# Patient Record
Sex: MALE | Race: OTHER | HISPANIC OR LATINO | URBAN - METROPOLITAN AREA
[De-identification: names, ages, dates, MRNs, and addresses within clinical notes are randomized per-mention and may not be internally consistent; named-entity substitution may affect disease eponyms.]

---

## 2021-06-27 ENCOUNTER — IMMUNIZATION (OUTPATIENT)
Dept: PRIMARY CARE CLINIC | Facility: CLINIC | Age: 21
End: 2021-06-27

## 2021-06-27 DIAGNOSIS — Z23 NEED FOR VACCINATION: Primary | ICD-10-CM

## 2021-06-27 PROCEDURE — 91300 COVID-19, MRNA, LNP-S, PF, 30 MCG/0.3 ML DOSE VACCINE: CPT | Mod: S$GLB,,, | Performed by: INTERNAL MEDICINE

## 2021-06-27 PROCEDURE — 0001A COVID-19, MRNA, LNP-S, PF, 30 MCG/0.3 ML DOSE VACCINE: CPT | Mod: CV19,S$GLB,, | Performed by: INTERNAL MEDICINE

## 2021-06-27 PROCEDURE — 91300 COVID-19, MRNA, LNP-S, PF, 30 MCG/0.3 ML DOSE VACCINE: ICD-10-PCS | Mod: S$GLB,,, | Performed by: INTERNAL MEDICINE

## 2021-06-27 PROCEDURE — 0001A COVID-19, MRNA, LNP-S, PF, 30 MCG/0.3 ML DOSE VACCINE: ICD-10-PCS | Mod: CV19,S$GLB,, | Performed by: INTERNAL MEDICINE

## 2021-07-18 ENCOUNTER — IMMUNIZATION (OUTPATIENT)
Dept: PRIMARY CARE CLINIC | Facility: CLINIC | Age: 21
End: 2021-07-18

## 2021-07-18 DIAGNOSIS — Z23 NEED FOR VACCINATION: Primary | ICD-10-CM

## 2021-07-18 PROCEDURE — 91300 COVID-19, MRNA, LNP-S, PF, 30 MCG/0.3 ML DOSE VACCINE: CPT | Mod: S$GLB,,, | Performed by: INTERNAL MEDICINE

## 2021-07-18 PROCEDURE — 0002A COVID-19, MRNA, LNP-S, PF, 30 MCG/0.3 ML DOSE VACCINE: CPT | Mod: CV19,S$GLB,, | Performed by: INTERNAL MEDICINE

## 2021-07-18 PROCEDURE — 0002A COVID-19, MRNA, LNP-S, PF, 30 MCG/0.3 ML DOSE VACCINE: ICD-10-PCS | Mod: CV19,S$GLB,, | Performed by: INTERNAL MEDICINE

## 2021-07-18 PROCEDURE — 91300 COVID-19, MRNA, LNP-S, PF, 30 MCG/0.3 ML DOSE VACCINE: ICD-10-PCS | Mod: S$GLB,,, | Performed by: INTERNAL MEDICINE

## 2022-06-26 ENCOUNTER — HOSPITAL ENCOUNTER (EMERGENCY)
Facility: HOSPITAL | Age: 22
Discharge: HOME | End: 2022-06-26

## 2022-06-26 VITALS
WEIGHT: 240 LBS | DIASTOLIC BLOOD PRESSURE: 72 MMHG | HEART RATE: 85 BPM | TEMPERATURE: 98 F | OXYGEN SATURATION: 98 % | SYSTOLIC BLOOD PRESSURE: 133 MMHG | RESPIRATION RATE: 14 BRPM

## 2022-06-26 DIAGNOSIS — S01.111A LACERATION OF EYEBROW AND FOREHEAD, RIGHT, INITIAL ENCOUNTER: ICD-10-CM

## 2022-06-26 DIAGNOSIS — S09.90XA CLOSED HEAD INJURY WITHOUT LOSS OF CONSCIOUSNESS, INITIAL ENCOUNTER: Primary | ICD-10-CM
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
## 2022-06-26 DIAGNOSIS — S01.81XA LACERATION OF EYEBROW AND FOREHEAD, RIGHT, INITIAL ENCOUNTER: ICD-10-CM

## 2022-06-26 PROCEDURE — 99283 EMERGENCY DEPT VISIT LOW MDM: CPT

## 2022-06-26 NOTE — DISCHARGE INSTRUCTIONS
COMMUNITY CLINICS     Kelechi Phillips   [address]   [phone]     Amador Bishop   [address] [facility]   [phone]     Anastacio Barrios    [address]   [phone]     Diego Nguyen   [address] [facility]   [phone]     [facility]   [address] for HIV  Patients   [phone]     OTHER    [facility]   [address]   [phone]     [facility]   [address]   [phone]     [facility]   [address] [facility]   [phone]     [facility]   [address] [facility]   [phone]     [facility]   [address]  (Mon- Wed       Fri 1-5pm)   [phone]     [facility]    [phone]     [facility]   [address]   [phone]     [facility]   [address] [facility]   [phone]     [facility]   [address]   [phone]     MENTAL HEALTH    [facility] [place]   [address] [facility]   [phone]     [facility]   [address] [place] [address]   [phone]     [facility]   [address] [facility]   [phone]     [facility]   [address]   [phone]     [facility]   [address] [place] [address]   [phone]     [facility]   [address] [facility]   [phone]     [facility]    [address] [place] [address] [phone]     [facility]    [phone]         MEDICARE AND MEDICAID SERVICES                                [phone]     [facility]    [facility] Appointment [facility] All Specialties    [phone]     [facility]   [address]   [phone]     Dermatology   [address]   [phone]     Ophthalmology Clinic   [address]   [phone]     Primary Care   [address]   [phone]     Infectious Disease   [address]   [phone]     [facility]   [address]   [phone]     [facility]  Practice    [phone]     [facility]   [address] [facility]   [phone]     \[facility]"" [facility]   [address] [facility]   [phone]
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
